# Patient Record
Sex: FEMALE | URBAN - METROPOLITAN AREA
[De-identification: names, ages, dates, MRNs, and addresses within clinical notes are randomized per-mention and may not be internally consistent; named-entity substitution may affect disease eponyms.]

---

## 2019-01-01 ENCOUNTER — VIRTUAL VISIT (OUTPATIENT)
Dept: FAMILY MEDICINE | Facility: OTHER | Age: 49
End: 2019-01-01

## 2019-01-02 NOTE — PROGRESS NOTES
"Date:   Clinician: Mei Juan  Clinician NPI: [id number]  Patient: Bre Mercado  Patient : 1970  Patient Address: [address] [place] [address] [address], [place] [address]  Patient Phone: [phone]  Visit Protocol: UTI  Patient Summary:  Bre is a 48 year old ( : 1970 ) female who initiated a Visit for a presumed bladder infection. When asked the question \"Please sign me up to receive news, health information and promotions from [employer].\", Bre responded \"No\".   Her symptoms started 1-3 days ago and consist of abdominal pain, urinary frequency, vaginal itching, urgency, and dysuria.   Symptom details     Urine color: The color of her urine is yellow.     Abdominal pain: The pain is mild (1-3 on a 10 point pain scale).      Denied symptoms include urinary incontinence, vaginal discharge, vomiting, feeling as if the bladder is never empty, flank pain, chills, foul-smelling urine, and nausea. She does not feel feverish.   Over-the-counter medications or home remedies used to relieve the current symptoms as reported by the patient (free text): azo urinary pain relief   Precipitating events  Bre denies having a sexually transmitted disease.  Pertinent medical history  Bre has had a bladder infection before and has had 2 in the past 12 months. Her most recent bladder infection was not within the last 4 weeks. Her current symptoms are similar to her previous bladder infection symptoms.   Sulfamethoxazole-trimethoprim (Bactrim DS) has been effective in treating her past bladder infections.   Bre typically gets a yeast infection when she takes antibiotics. She has used fluconazole (Diflucan) to treat previous yeast infections. 1 dose of fluconazole (Diflucan) has typically been sufficient for symptoms to resolve in the past.   Bre has not been prescribed antibiotics to prevent frequent or repeated bladder infections in the past. She has not experienced problems or side effects with any of the " common antibiotics used to treat bladder infections.   Bre does not have a history of kidney stones. She has not used a catheter or been a patient in a hospital or nursing home in the past 2 weeks.   Bre does not smoke or use smokeless tobacco.   She denies pregnancy and denies breastfeeding. She does not menstruate.   MEDICATIONS: Azo Urinary Pain Relief oral, ALLERGIES: Iodinated Contrast- Oral and IV Dye  Clinician Response:  Dear Bre,  Based on the information you have provided, you likely have an acute urinary tract infection, also called a bladder infection. Bladder infections occur when bacteria from the outside of the body enters the urinary tract. Any part of the urinary system can be infected, but the bladder is the most common.  Medication information  I am prescribing:     Sulfamethoxazole-trimethoprim (Bactrim DS) 800-160 mg oral tablet. Take 1 tablet by mouth every 12 hours for 3 days. There are no refills with this prescription.   The medication I prescribed for your bladder infection is an antibiotic. Continue taking the medication until it is gone even if you feel better. If you get an upset stomach while taking antibiotics, taking the medication with food can help.   Because you usually get a yeast infection when taking antibiotics, I am also prescribing:     Fluconazole (Diflucan) 150 mg oral tablet. Take 1 tablet by mouth 1 time a day for 1 day. There are no refills with this prescription.   Self care  Urination helps to flush bacteria from the urinary tract. For this reason, drinking water and urinating often helps relieve some symptoms of a bladder infection and can decrease your risk of getting bladder infections in the future.  Other steps you can take to prevent future bladder infections include:     Wipe front to back after using the bathroom    Urinate after sexual intercourse    Avoid using deodorant sprays, douches, or powders in the vaginal area     When to seek care  Please  make an appointment to be seen in a clinic or urgent care if any of the following occur:     You develop new symptoms or your symptoms become worse    You have medication side effects that make it difficult to take them as prescribed    Your symptoms do not improve within 1-2 days of starting treatment    You have symptoms of a bladder infection that return shortly after completing treatment     It is possible to have an allergic reaction to an antibiotic even if you have not had one in the past. If you notice a new rash, significant swelling, or difficulty breathing, stop taking this medication immediately and go to a clinic or urgent care.   Diagnosis: Acute uncomplicated bladder infection  Diagnosis ICD: N39.0  Prescription: sulfamethoxazole-trimethoprim (Bactrim DS) 800-160 mg oral tablet 6 tablet, 3 days supply. Take 1 tablet by mouth every 12 hours for 3 days. Refills: 0, Refill as needed: no, Allow substitutions: yes  Prescription: fluconazole (Diflucan) 150 mg oral tablet 1 1 tablet blister pack, 1 days supply. Take 1 tablet by mouth 1 time a day for 1 day. Refills: 0, Refill as needed: no, Allow substitutions: yes  Pharmacy: [id number] - [facility] - [phone] - [address] [place] [phone]

## 2019-06-04 ENCOUNTER — VIRTUAL VISIT (OUTPATIENT)
Dept: FAMILY MEDICINE | Facility: OTHER | Age: 49
End: 2019-06-04

## 2019-06-04 NOTE — PROGRESS NOTES
"Date:   Clinician: Juanita Kelly  Clinician NPI: [id number]  Patient: Bre Mercado  Patient : 1970  Patient Address: [address] [place] [address] [address], [place] [address]  Patient Phone: [phone]  Visit Protocol: UTI  Patient Summary:  Bre is a 49 year old ( : 1970 ) female who initiated a Visit for a presumed bladder infection. When asked the question \"Please sign me up to receive news, health information and promotions from [employer].\", Bre responded \"No\".   Her symptoms started 4-6 days ago and consist of feeling as if the bladder is never empty, abdominal pain, urinary frequency, urgency, and urinary incontinence.   Symptom details     Urine color: The color of her urine is yellow.     Abdominal pain: The pain is mild (1-3 on a 10 point pain scale).      Denied symptoms include chills, vaginal discharge, dysuria, foul-smelling urine, nausea, flank pain, vomiting, and vaginal itching. She does not feel feverish.   Over-the-counter medications or home remedies used to relieve the current symptoms as reported by the patient (free text): Phenazopyridine Hydrocholoride 95 mg   Precipitating events  Bre denies having a sexually transmitted disease.  Pertinent medical history  Bre has had a bladder infection before and has had 2 in the past 12 months. Her most recent bladder infection was not within the last 4 weeks. Her current symptoms are similar to her previous bladder infection symptoms.   She is not sure what antibiotics have been effective in treating her past bladder infections.   Bre typically gets a yeast infection when she takes antibiotics. She has used fluconazole (Diflucan) to treat previous yeast infections. 1 dose of fluconazole (Diflucan) has typically been sufficient for symptoms to resolve in the past.   Bre has not been prescribed antibiotics to prevent frequent or repeated bladder infections in the past. She has not experienced problems or side effects " with any of the common antibiotics used to treat bladder infections.   Bre does not have a history of kidney stones. She has not used a catheter or been a patient in a hospital or nursing home in the past 2 weeks.   Bre does not smoke or use smokeless tobacco.   She denies pregnancy and denies breastfeeding. She does not menstruate.   MEDICATIONS: Azo Urinary Pain Relief oral, ALLERGIES: Iodinated Contrast- Oral and IV Dye  Clinician Response:  Dear Bre,  Based on the information you have provided, you likely have an acute urinary tract infection, also called a bladder infection. Bladder infections occur when bacteria from the outside of the body enters the urinary tract. Any part of the urinary system can be infected, but the bladder is the most common.  Medication information  I am prescribing:     Sulfamethoxazole-trimethoprim (Bactrim DS) 800-160 mg oral tablet. Take 1 tablet by mouth every 12 hours for 3 days. There are no refills with this prescription.   Certain antibiotics such as Bactrim and Ciprofloxacin can cause your skin to be more sensitive to the sun. Exposure to the sun when taking these antibiotics may cause skin rash, itching, redness, or a severe sunburn. Be sure to avoid prolonged or direct exposure to the sun, especially between 10 am and 3 pm, if possible. Wear protective clothing and use sunscreen of SPF 30 or higher when you are outdoors.  The medication I prescribed for your bladder infection is an antibiotic. Continue taking the medication until it is gone even if you feel better. If you get an upset stomach while taking antibiotics, taking the medication with food can help.   Because you usually get a yeast infection when taking antibiotics, I am also prescribing:     Fluconazole (Diflucan) 150 mg oral tablet. Take 1 tablet by mouth 1 time a day for 1 day. There are no refills with this prescription.   Self care  Urination helps to flush bacteria from the urinary tract. For this  reason, drinking water and urinating often helps relieve some urinary symptoms and can decrease your risk of getting bladder infections in the future.  Other steps you can take to prevent future bladder infections include:     Wipe front to back after using the bathroom    Urinate after sexual intercourse    Avoid using deodorant sprays, douches, or powders in the vaginal area     When to seek care  Please make an appointment to be seen in a clinic or urgent care if any of the following occur:     You develop new symptoms or your symptoms become worse    You have medication side effects that make it difficult to take them as prescribed    Your symptoms do not improve within 1-2 days of starting treatment    You have symptoms of a bladder infection that return shortly after completing treatment     It is possible to have an allergic reaction to an antibiotic even if you have not had one in the past. If you notice a new rash, significant swelling, or difficulty breathing, stop taking this medication immediately and go to a clinic or urgent care.   Diagnosis: Acute uncomplicated bladder infection  Diagnosis ICD: N39.0  Prescription: fluconazole (Diflucan) 150 mg oral tablet 1 1 tablet blister pack, 1 days supply. Take 1 tablet by mouth 1 time a day for 1 day. Refills: 0, Refill as needed: no, Allow substitutions: yes  Prescription: sulfamethoxazole-trimethoprim (Bactrim DS) 800-160 mg oral tablet 6 tablet, 3 days supply. Take 1 tablet by mouth every 12 hours for 3 days. Refills: 0, Refill as needed: no, Allow substitutions: yes  Pharmacy: [facility] - [phone] - [address] [place] [address]

## 2020-04-15 ENCOUNTER — VIRTUAL VISIT (OUTPATIENT)
Dept: FAMILY MEDICINE | Facility: OTHER | Age: 50
End: 2020-04-15

## 2020-04-15 NOTE — PROGRESS NOTES
"Date: 04/15/2020 16:19:33  Clinician: Adeola Richard  Clinician NPI: [id number]  Patient: Bre Mercado  Patient : 1970  Patient Address: [address] [place] [address] [address], [place] [id number]  Patient Phone: [phone]  Visit Protocol: UTI  Patient Summary:  Bre is a 50 year old ( : 1970 ) female who initiated a Visit for a presumed bladder infection. When asked the question \"Please sign me up to receive news, health information and promotions from [employer]", Bre responded \"No\".   Her symptoms started 1-3 days ago and consist of dysuria, feeling as if the bladder is never empty, urinary frequency, urgency, and urinary incontinence.   Symptom details   Urine color: The color of her urine is yellow.    Denied symptoms include foul-smelling urine, nausea, flank pain, abdominal pain, chills, vaginal discharge, vomiting, and vaginal itching. She does not feel feverish.   Over-the-counter medications or home remedies used to relieve the current symptoms as reported by the patient (free text): phenazopyridine hydrochloride 95mg   Precipitating events  Bre denies having a sexually transmitted disease.  Pertinent medical history  Bre has had a bladder infection before and has had 3 in the past 12 months. Her most recent bladder infection was not within the last 4 weeks. Her current symptoms are similar to her previous bladder infection symptoms.   Sulfamethoxazole-trimethoprim (Bactrim DS) has been effective in treating her past bladder infections.   Bre typically gets a yeast infection when she takes antibiotics. She has used fluconazole (Diflucan) to treat previous yeast infections. 2 doses of fluconazole (Diflucan) has typically been needed for symptoms to resolve in the past.  Bre has not been prescribed antibiotics to prevent frequent or repeated bladder infections in the past. She has not experienced problems or side effects with any of the common antibiotics used to treat bladder infections.   " Bre does not have a history of kidney stones. She has not used a catheter or been a patient in a hospital or nursing home in the past 2 weeks.   Bre does not smoke or use smokeless tobacco.   She denies pregnancy and denies breastfeeding. She does not menstruate.     MEDICATIONS: Azo Urinary Pain Relief oral, ALLERGIES: Iodinated Contrast Media  Clinician Response:  Dear Bre,  Based on the information you have provided, you likely have an acute urinary tract infection, also called a bladder infection. Bladder infections occur when bacteria from the outside of the body enters the urinary tract. Any part of the urinary system can be infected, but the bladder is the most common.  Medication information  I am prescribing:     Sulfamethoxazole-trimethoprim (Bactrim DS) 800-160 mg oral tablet. Take 1 tablet by mouth every 12 hours for 3 days. There are no refills with this prescription.   Certain antibiotics such as Bactrim and Ciprofloxacin can cause your skin to be more sensitive to the sun. Exposure to the sun when taking these antibiotics may cause skin rash, itching, redness, or a severe sunburn. Be sure to avoid prolonged or direct exposure to the sun, especially between 10 am and 3 pm, if possible. Wear protective clothing and use sunscreen of SPF 30 or higher when you are outdoors.  The medication I prescribed for your bladder infection is an antibiotic. Continue taking the medication until it is gone even if you feel better. If you get an upset stomach while taking antibiotics, taking the medication with food can help.   Because you usually get a yeast infection when taking antibiotics, I am also prescribing:     Fluconazole (Diflucan) 150 mg oral tablet. Take 1 tablet by mouth in a single dose. Repeat dose in 3 days if symptoms are still present. There are no refills with this prescription.   Self care  Urination helps to flush bacteria from the urinary tract. For this reason, drinking water and  urinating often helps relieve some urinary symptoms and can decrease your risk of getting bladder infections in the future.  Other steps you can take to prevent future bladder infections include:     Wipe front to back after using the bathroom    Urinate after sexual intercourse    Avoid using deodorant sprays, douches, or powders in the vaginal area     When to seek care  Please make an appointment to be seen in a clinic or urgent care if any of the following occur:     You develop new symptoms or your symptoms become worse    You have medication side effects that make it difficult to take them as prescribed    Your symptoms do not improve within 1-2 days of starting treatment    You have symptoms of a bladder infection that return shortly after completing treatment     It is possible to have an allergic reaction to an antibiotic even if you have not had one in the past. If you notice a new rash, significant swelling, or difficulty breathing, stop taking this medication immediately and go to a clinic or urgent care.   Diagnosis: Acute uncomplicated bladder infection  Diagnosis ICD: N39.0  Prescription: fluconazole (Diflucan) 150 mg oral tablet 2 tablet, 4 days supply. Take 1 tablet by mouth in a single dose, repeat dose in 3 days if symptoms are still present. Refills: 0, Refill as needed: no, Allow substitutions: yes  Prescription: sulfamethoxazole-trimethoprim (Bactrim DS) 800-160 mg oral tablet 6 tablet, 3 days supply. Take 1 tablet by mouth every 12 hours for 3 days. Refills: 0, Refill as needed: no, Allow substitutions: yes  Pharmacy: [facility] - [phone] - [address] [place] [address]